# Patient Record
Sex: FEMALE | Race: WHITE | NOT HISPANIC OR LATINO | Employment: FULL TIME | URBAN - METROPOLITAN AREA
[De-identification: names, ages, dates, MRNs, and addresses within clinical notes are randomized per-mention and may not be internally consistent; named-entity substitution may affect disease eponyms.]

---

## 2023-03-23 ENCOUNTER — OFFICE VISIT (OUTPATIENT)
Dept: OBSTETRICS AND GYNECOLOGY | Facility: CLINIC | Age: 33
End: 2023-03-23
Payer: COMMERCIAL

## 2023-03-23 VITALS
BODY MASS INDEX: 33.99 KG/M2 | WEIGHT: 204 LBS | HEART RATE: 66 BPM | SYSTOLIC BLOOD PRESSURE: 137 MMHG | HEIGHT: 65 IN | DIASTOLIC BLOOD PRESSURE: 86 MMHG

## 2023-03-23 DIAGNOSIS — Z01.419 ROUTINE GYNECOLOGICAL EXAMINATION: Primary | ICD-10-CM

## 2023-03-23 PROCEDURE — 3075F PR MOST RECENT SYSTOLIC BLOOD PRESS GE 130-139MM HG: ICD-10-PCS | Mod: CPTII,S$GLB,, | Performed by: OBSTETRICS & GYNECOLOGY

## 2023-03-23 PROCEDURE — 3075F SYST BP GE 130 - 139MM HG: CPT | Mod: CPTII,S$GLB,, | Performed by: OBSTETRICS & GYNECOLOGY

## 2023-03-23 PROCEDURE — 3079F DIAST BP 80-89 MM HG: CPT | Mod: CPTII,S$GLB,, | Performed by: OBSTETRICS & GYNECOLOGY

## 2023-03-23 PROCEDURE — 99395 PR PREVENTIVE VISIT,EST,18-39: ICD-10-PCS | Mod: S$GLB,,, | Performed by: OBSTETRICS & GYNECOLOGY

## 2023-03-23 PROCEDURE — 3008F PR BODY MASS INDEX (BMI) DOCUMENTED: ICD-10-PCS | Mod: CPTII,S$GLB,, | Performed by: OBSTETRICS & GYNECOLOGY

## 2023-03-23 PROCEDURE — 99395 PREV VISIT EST AGE 18-39: CPT | Mod: S$GLB,,, | Performed by: OBSTETRICS & GYNECOLOGY

## 2023-03-23 PROCEDURE — 3079F PR MOST RECENT DIASTOLIC BLOOD PRESSURE 80-89 MM HG: ICD-10-PCS | Mod: CPTII,S$GLB,, | Performed by: OBSTETRICS & GYNECOLOGY

## 2023-03-23 PROCEDURE — 3008F BODY MASS INDEX DOCD: CPT | Mod: CPTII,S$GLB,, | Performed by: OBSTETRICS & GYNECOLOGY

## 2023-03-23 RX ORDER — TIRZEPATIDE 5 MG/.5ML
INJECTION, SOLUTION SUBCUTANEOUS
COMMUNITY
Start: 2022-12-06

## 2023-03-23 NOTE — PROGRESS NOTES
Answers submitted by the patient for this visit:  Gynecologic Exam Questionnaire  (Submitted on 3/23/2023)  Chief Complaint: Gynecologic exam  genital itching: No  genital lesions: No  genital odor: No  genital rash: No  missed menses: No  pelvic pain: No  vaginal bleeding: No  vaginal discharge: No  Pregnant now?: No  abdominal pain: No  anorexia: No  back pain: No  chills: No  constipation: No  diarrhea: No  discolored urine: No  dysuria: No  fever: No  flank pain: No  frequency: No  headaches: No  hematuria: No  nausea: No  painful intercourse: No  rash: No  urgency: No  vomiting: No  Please select the characteristics of your discharge: : normal  Vaginal bleeding: typical of menses  Passing clots?: No  Passing tissue?: No  Sexual activity: sexually active  Partner with STD symptoms: no  Birth control: nothing  Menstrual history: regular  STD: No  abdominal surgery: Yes   section: Yes

## 2023-03-23 NOTE — PROGRESS NOTES
Subjective:       Patient ID: Grace Brooks is a 32 y.o. female.    Chief Complaint:  Well Woman      History of Present Illness  Gynecologic Exam  The patient's pertinent negatives include no genital itching, genital lesions, genital odor, genital rash, missed menses, pelvic pain, vaginal bleeding or vaginal discharge. She is not pregnant. Pertinent negatives include no abdominal pain, anorexia, back pain, chills, constipation, diarrhea, discolored urine, dysuria, fever, flank pain, frequency, headaches, hematuria, nausea, painful intercourse, rash, urgency or vomiting. The vaginal discharge was normal. The vaginal bleeding is typical of menses. She has not been passing clots. She has not been passing tissue. She is sexually active. No, her partner does not have an STD. She uses nothing for contraception. Her menstrual history has been regular. Her past medical history is significant for an abdominal surgery and a  section. There is no history of an STD.   Pateint had abnormal pap in , had a scraping and has been normal since then.    GYN & OB History  Patient's last menstrual period was 03/10/2023.   Date of Last Pap: No result found    OB History    Para Term  AB Living   2 2           SAB IAB Ectopic Multiple Live Births                  # Outcome Date GA Lbr Satya/2nd Weight Sex Delivery Anes PTL Lv   2 Para            1 Para                Review of Systems  Review of Systems   Constitutional:  Negative for chills and fever.   Gastrointestinal:  Negative for abdominal pain, anorexia, constipation, diarrhea, nausea and vomiting.   Genitourinary:  Negative for dysuria, flank pain, frequency, hematuria, missed menses, pelvic pain, urgency and vaginal discharge.   Musculoskeletal:  Negative for back pain.   Integumentary:  Negative for rash.   Neurological:  Negative for headaches.         Objective:    Physical Exam:   Constitutional: She is oriented to person, place, and time. Vital  signs are normal. She appears well-developed and well-nourished. She is cooperative.      Neck: No thyroid mass and no thyromegaly present.    Cardiovascular:  Normal rate, regular rhythm and normal pulses.             Pulmonary/Chest: Effort normal. No respiratory distress. Chest wall is not dull to percussion. She exhibits no mass, no bony tenderness, no laceration, no crepitus, no edema, no deformity, no swelling and no retraction. Right breast exhibits no inverted nipple, no mass, no nipple discharge, no skin change, no tenderness, presence, no bleeding and no swelling. Left breast exhibits no inverted nipple, no mass, no nipple discharge, no skin change, no tenderness, presence, no bleeding and no swelling.        Abdominal: Soft. She exhibits no distension. There is no abdominal tenderness. No hernia.     Genitourinary:    Vagina, uterus and rectum normal.      Pelvic exam was performed with patient supine.   Labial bartholins normal.There is no rash, tenderness, lesion or injury on the right labia. There is no rash, tenderness, lesion or injury on the left labia. Cervix is normal. Right adnexum displays no mass, no tenderness and no fullness. Left adnexum displays no mass, no tenderness and no fullness. No  no vaginal discharge, rectocele, cystocele or unspecified prolapse of vaginal walls in the vagina.           Musculoskeletal: Moves all extremeties.       Neurological: She is alert and oriented to person, place, and time.    Skin: Skin is warm and dry. No rash noted.    Psychiatric: She has a normal mood and affect. Her speech is normal and behavior is normal. Judgment and thought content normal.        Assessment:        1. Routine gynecological examination                Plan:      Routine care

## 2023-03-29 LAB — Lab: NORMAL

## 2024-10-08 ENCOUNTER — OFFICE VISIT (OUTPATIENT)
Dept: OBSTETRICS AND GYNECOLOGY | Facility: CLINIC | Age: 34
End: 2024-10-08
Payer: COMMERCIAL

## 2024-10-08 VITALS
BODY MASS INDEX: 32.92 KG/M2 | SYSTOLIC BLOOD PRESSURE: 115 MMHG | DIASTOLIC BLOOD PRESSURE: 77 MMHG | WEIGHT: 197.81 LBS | HEART RATE: 78 BPM

## 2024-10-08 DIAGNOSIS — Z01.419 WOMEN'S ANNUAL ROUTINE GYNECOLOGICAL EXAMINATION: Primary | ICD-10-CM

## 2024-10-08 PROCEDURE — 3078F DIAST BP <80 MM HG: CPT | Mod: CPTII,,, | Performed by: OBSTETRICS & GYNECOLOGY

## 2024-10-08 PROCEDURE — 99395 PREV VISIT EST AGE 18-39: CPT | Mod: S$PBB,,, | Performed by: OBSTETRICS & GYNECOLOGY

## 2024-10-08 PROCEDURE — 3074F SYST BP LT 130 MM HG: CPT | Mod: CPTII,,, | Performed by: OBSTETRICS & GYNECOLOGY

## 2024-10-08 PROCEDURE — 3008F BODY MASS INDEX DOCD: CPT | Mod: CPTII,,, | Performed by: OBSTETRICS & GYNECOLOGY

## 2024-10-08 PROCEDURE — 1159F MED LIST DOCD IN RCRD: CPT | Mod: CPTII,,, | Performed by: OBSTETRICS & GYNECOLOGY

## 2024-10-08 NOTE — PROGRESS NOTES
Subjective:       Patient ID: Grace Brooks is a 34 y.o. female.    Chief Complaint:  Well Woman      History of Present Illness  Pt here for gyn annual.  History and past labs reviewed with patient.    Complaints none       Review of Systems  Review of Systems   Constitutional:  Negative for chills and fever.   Respiratory:  Negative for shortness of breath.    Cardiovascular:  Negative for chest pain.   Gastrointestinal:  Negative for abdominal pain, blood in stool, constipation, diarrhea, nausea, vomiting and reflux.   Genitourinary:  Negative for dysmenorrhea, dyspareunia, dysuria, hematuria, hot flashes, menorrhagia, menstrual problem, pelvic pain, vaginal bleeding, vaginal discharge, postcoital bleeding and vaginal dryness.   Musculoskeletal:  Negative for arthralgias and joint swelling.   Integumentary:  Negative for rash, hair changes, breast mass, nipple discharge and breast skin changes.   Psychiatric/Behavioral:  Negative for depression. The patient is not nervous/anxious.    Breast: Negative for asymmetry, lump, mass, nipple discharge and skin changes          Objective:     Vitals:    10/08/24 1341   BP: 115/77   BP Location: Right arm   Patient Position: Sitting   Pulse: 78   Weight: 89.7 kg (197 lb 12.8 oz)      Female chaperone present   Physical Exam:   Constitutional: She appears well-developed and well-nourished. No distress.    HENT:   Head: Normocephalic and atraumatic.    Eyes: Conjunctivae and EOM are normal.    Neck: No tracheal deviation present. No thyromegaly present.    Cardiovascular:       Exam reveals no clubbing, no cyanosis and no edema.        Pulmonary/Chest: Effort normal. No respiratory distress.        Abdominal: Soft. She exhibits no distension and no mass. There is no abdominal tenderness. There is no rebound and no guarding. No hernia.     Genitourinary:    Vagina, uterus and rectum normal.      Pelvic exam was performed with patient supine.   There is no rash,  tenderness, lesion or injury on the right labia. There is no rash, tenderness, lesion or injury on the left labia. Cervix is normal. Right adnexum displays no mass, no tenderness and no fullness. Left adnexum displays no mass, no tenderness and no fullness.                Skin: She is not diaphoretic. No cyanosis. Nails show no clubbing.         breast exam wnl- no nipple dc, skin changes, masses or lymph nodes palpated bilaterally    Assessment:        1. Women's annual routine gynecological examination                Plan:      Annual   Pap UTD   STD panel declined    Contraception - partner vasectomy   Risk assessment for inherited gyn cancer done - neg   RTC  1 year     Patient was counseled today on current [facility]P pap guidelines, the recommendation for yearly pelvic exams, healthy diet and exercise routines, annual mammograms starting at age 40, and breast self awareness. She is to see her PCP for other health maintenance